# Patient Record
Sex: FEMALE | Race: WHITE
[De-identification: names, ages, dates, MRNs, and addresses within clinical notes are randomized per-mention and may not be internally consistent; named-entity substitution may affect disease eponyms.]

---

## 2018-02-19 ENCOUNTER — HOSPITAL ENCOUNTER (EMERGENCY)
Dept: HOSPITAL 45 - C.EDB | Age: 18
Discharge: HOME | End: 2018-02-19
Payer: COMMERCIAL

## 2018-02-19 VITALS — SYSTOLIC BLOOD PRESSURE: 124 MMHG | OXYGEN SATURATION: 98 % | HEART RATE: 82 BPM | DIASTOLIC BLOOD PRESSURE: 53 MMHG

## 2018-02-19 VITALS — TEMPERATURE: 98.78 F

## 2018-02-19 DIAGNOSIS — L23.9: Primary | ICD-10-CM

## 2018-02-19 NOTE — EMERGENCY ROOM VISIT NOTE
History


First contact with patient:  18:48


Chief Complaint:  SKIN PROBLEM


Stated Complaint:  ALLERGIC REACTION,ITCHY,HIVES,SWOLLEN FACE





History of Present Illness


The patient is a 17 year old female who presents to the Emergency Room with 

complaints of a possible allergic reaction.  The patient reports that since 

last night, she has had a red rash to both of her legs and arms which is 

extremely itchy.  She reports this has happened to her once in the past after 

taking a medication.  She denies any new lotions, detergents, medications or 

foods.  She denies facial swelling, difficulty swallowing, difficulty breathing

, nausea or vomiting.  She was seen at [facility] and states 

they told her to take ibuprofen for her symptoms.





Review of Systems


A complete 10 point review of systems was reviewed with the patient with 

pertinent positives and negatives as per history of present illness. All else 

were negative.





Past Medical/Surgical History





Medical Problems:


(1) No significant past medical history


Surgical Problems:


(1) No significant past surgical history





Social History


Smoking Status:  Never Smoker


Marital Status:  single


Housing Status:  lives with roommate


Occupation Status:  [employer] student





Current/Historical Medications


Scheduled


Prednisone (Prednisone Tab), 3 TAB PO DAILY





Physical Exam


Vital Signs











  Date Time  Temp Pulse Resp B/P (MAP) Pulse Ox O2 Delivery O2 Flow Rate FiO2


 


2/19/18 20:38  82 18 124/53 98 Room Air  


 


2/19/18 18:38 37.1 109 20 142/80 98 Room Air  











Physical Exam


VITALS: Vitals are noted on the nurse's note and reviewed by myself.  Vital 

signs stable.


GENERAL: This is a 17-year-old female, in no acute distress, nondiaphoretic, 

well-developed well-nourished.


SKIN: There is a raised urticarial rash to bilateral lower extremities and 

bilateral upper extremities diffusely.  The rash blanches with pressure.  There 

is evidence of excoriation.


EARS: External auditory canals clear, tympanic membranes pearly gray without 

erythema or effusion bilaterally.


EYES: Pupils equal round and reactive to light and accommodation. 


MOUTH: Mucous membranes moist.  Bilateral tonsils mildly enlarged with small 

amount of exudate present.


NECK: Supple without nuchal rigidity.  No lymphadenopathy. 


HEART: Regular rate and rhythm without murmurs gallops or rubs.


LUNGS: Clear to auscultation bilaterally without wheezes, rales or rhonchi.  


NEURO: Patient was alert and oriented to person place and time.





Medical Decision & Procedures


Medications Administered











 Medications


  (Trade)  Dose


 Ordered  Sig/Franck


 Route  Start Time


 Stop Time Status Last Admin


Dose Admin


 


 Prednisone


  (PredniSONE TAB)  60 mg  NOW  STAT


 PO  2/19/18 19:18


 2/19/18 19:19 DC 2/19/18 19:26


60 MG


 


 Diphenhydramine


 HCl


  (Benadryl Cap)  25 mg  NOW  ONCE


 PO  2/19/18 19:30


 2/19/18 19:31 DC 2/19/18 19:25


25 MG


 


 Diphenhydramine


 HCl


  (Benadryl Cap)  25 mg  NOW  STAT


 PO  2/19/18 20:12


 2/19/18 20:13 DC 2/19/18 20:36


25 MG











Medical Decision


Differential diagnosis includes allergic dermatitis, contact dermatitis, 

anaphylaxis, strep pharyngitis, among others.





The patient was evaluated as above.  Her rash and symptoms are consistent with 

an allergic reaction to an unknown substance.  On exam, patient's tonsils are 

enlarged bilaterally and appear to have exudate present.  I questioned the 

patient on whether she had a sore throat and she stated that she did not.  Later

, she was evaluated by the nurse who also told her that her tonsils were 

enlarged.  She then stated that she felt like maybe she had a slight sore throat

, but only developed this after being told that her tonsils were enlarged.  A 

rapid strep test was performed and was negative.  Culture is pending.  Patient 

was given prednisone and Benadryl orally for her symptoms.  She was offered IM 

Decadron but declined, stating that she does not like needles.  She will be 

placed on prednisone.  She was instructed to continue Benadryl at home to help 

with symptomatic relief.  She will follow-up with [facility] 

for recheck.  She verbalized understanding of my assessment and treatment plan 

and was discharged home in good condition.





Medication Reconcilliation


Current Medication List:  was personally reviewed by me





Blood Pressure Screening


Patient's blood pressure:  Normal blood pressure





Impression





 Primary Impression:  


 Allergic dermatitis





Departure Information


Dispostion


Home / Self-Care





Condition


GOOD





Prescriptions





Prednisone (Prednisone Tab) 20 Mg Tab


3 TAB PO DAILY for 4 Days, #12 TAB


   FOR 4 DAYS


   Prov: Maribell Johnson ., JOHN         2/19/18





Referrals


No Doctor, Assigned (PCP)





Patient Instructions


My [facility]





Additional Instructions





You have been treated in the Emergency Department for an Allergic Reaction. You 

have been treated and monitored in the Emergency Department appropriately.





You should take Benadryl (diphenhydramine) 25-50 mg orally every 4-6 hours as 

needed for itching. This medication is over-the-counter and you will NOT need a 

prescription to purchase this at your local pharmacy. 





You have been prescribed Prednisone 60 mg to be taken orally once a day for the 

next 4 days. This is an anti-inflammatory medicine to be used to help minimize 

your symptoms. You should take the COMPLETE  course of the medication.





Follow-up with [facility] this week for recheck.





Return to the Emergency Department if your current symptoms worsen despite 

treatment course outlined above, or if you develop any of the following symptoms

: wheezing, tongue or face swelling, tightness in your throat, shortness of 

breath, or fainting.